# Patient Record
(demographics unavailable — no encounter records)

---

## 2024-10-28 NOTE — ASSESSMENT
[FreeTextEntry1] : Ms. 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  10/07/2024 BW: A1c 8.9% Na+/Cl- 148/110, MCV//34.9, Bun 35, Cr 2.02, eGFR 25, ALT 6, . WDL- H/H, WBC, Cancelled INR. + Heterogenicity of liver reflecting Cirrhosis + US Ab.  > K+ 3.4: On Kcl 10 meq OD advised to take one extra dose prior to the upcoming appt with her Neph Dr. Valiente. > Na+ 146 on Sodium bicarbonate 325 mg TID, advised to stop for now and reach out to Neph MD for further Mx. + LVEF reduced at 30-35% showed on TTE, c/t Jan 2023 her LVEF was 55%. C/t F/u with Cardiology.  + Elevated Na+Cl- Normalized + Elevated ALP - Normalized in the reviewed labs, Advised to C/w Ursodiol. > PH/o isolated ALP elevation 146 <173 (05/05/2021) on Ursodiol at weight based 500 mg BID. Started on MARIANNE for Elevated Tbil and other WDL liver enzymes was reviewed.   # Cirrhosis + on imaging reviewed. discordant with Fibroscan F0-1, will continue with Cirrhosis surveillance Q6 Months. c/t Liver biopsy done April 28, 2011 with an isolated non-necrotizing granuloma without significant fibrosis and no portal inflammation. > HCC screening: No focal hepatic lesions are identified in US ab with WDL-AFP  + PHTN: On carvedilol 3.125 mg BID.  > No EV on the in-Hosp EGD 07/2023. > Thrombocytopenia: mildly low levels, reviewed BW, most probably R/T cirrhosis.  > Ascites: none seen in the imaging. Off Furosemide 20 mg Mx by her NEPH MD. + HE: Advised to stop Lactulose with c/o Diarrhea and still takes it as her  is adherent to the med list.  > Advised to c/w Xifaxan 550 mg BD. + PVT - Reviewed the finding of PVT. I would not want to start on anticoagulation due to her multiple medical problems. She also has a spontaneous splenorenal shunt which may help decompress her portal system.  # RHM: Followed by multiple specialties like Cardiology, Rheumatology, Hematology and Nephrology due to complex health issues. + Immune to HBV, COVID+spike > 250, not to HAV (12/30/2022) s/p HAV INJ on 07/21/2023, will recheck for the Antibodies with next labs. + CT Colonography on 04/20/2022: No obvious colonic polyp or mass. Noted: Atherosclerotic calcifications. Mild diverticulosis. GI Dr. Jayce Recinos + CKD - Reviewed the labs with low eGFR with Elevated Cr, followed by the nephrology Dr. Vlaiente + Anemia - closely F/u with her hematology with labs.  + Elevated uric Acid - Takes allopurinol for Gout maintenance, and F/U with Endocrinology.  F/U in 04/25/2024 with fasting labs/US Ab prior to RPA. Plan of care explained and written down for Spouse Ole. Encouraged to call back in the interim with any issues or concerns so that we can address and assist as required.

## 2024-10-28 NOTE — PHYSICAL EXAM
[Non-Tender] : non-tender [Smooth] : smooth [General Appearance - Alert] : alert [General Appearance - In No Acute Distress] : in no acute distress [Sclera] : the sclera and conjunctiva were normal [PERRL With Normal Accommodation] : pupils were equal in size, round, and reactive to light [Extraocular Movements] : extraocular movements were intact [Neck Appearance] : the appearance of the neck was normal [Neck Cervical Mass (___cm)] : no neck mass was observed [Jugular Venous Distention Increased] : there was no jugular-venous distention [Thyroid Diffuse Enlargement] : the thyroid was not enlarged [Thyroid Nodule] : there were no palpable thyroid nodules [Auscultation Breath Sounds / Voice Sounds] : lungs were clear to auscultation bilaterally [Heart Rate And Rhythm] : heart rate was normal and rhythm regular [Heart Sounds] : normal S1 and S2 [Heart Sounds Gallop] : no gallops [Murmurs] : no murmurs [Heart Sounds Pericardial Friction Rub] : no pericardial rub [Nonpitting Edema] : nonpitting edema present [___ +] : bilateral [unfilled]+ pretibial pitting edema [Bowel Sounds] : normal bowel sounds [Abdomen Soft] : soft [Abdomen Tenderness] : non-tender [Abdomen Mass (___ Cm)] : no abdominal mass palpated [Cervical Lymph Nodes Enlarged Posterior Bilaterally] : posterior cervical [Supraclavicular Lymph Nodes Enlarged Bilaterally] : supraclavicular [Abnormal Walk] : normal gait [Nail Clubbing] : no clubbing  or cyanosis of the fingernails [Musculoskeletal - Swelling] : no joint swelling seen [Motor Tone] : muscle strength and tone were normal [Skin Color & Pigmentation] : normal skin color and pigmentation [Skin Turgor] : normal skin turgor [] : no rash [Deep Tendon Reflexes (DTR)] : deep tendon reflexes were 2+ and symmetric [Sensation] : the sensory exam was normal to light touch and pinprick [No Focal Deficits] : no focal deficits [Oriented To Time, Place, And Person] : oriented to person, place, and time [Impaired Insight] : insight and judgment were intact [Affect] : the affect was normal [Scleral Icterus] : No Scleral Icterus [Hepatojugular Reflux] : patient did not have a sustained hepatojugular reflux [Spider Angioma] : No spider angioma(s) were observed [Abdominal Bruit] : no abdominal bruit [Abdominal  Ascites] : no ascites [Asterixis] : no asterixis observed [Cognitive Mini-Mental Status Normal?] : Cognitive Mini Mental Status Exam is abnormal [Jaundice] : No jaundice [Palmar Erythema] : no Palmar Erythema [Depression] : no depression [FreeTextEntry1] : Wears belt to support back for pain.

## 2024-10-28 NOTE — REVIEW OF SYSTEMS
[Arthralgias] : arthralgias [Negative] : Heme/Lymph [Joint Swelling] : no joint swelling [Joint Stiffness] : no joint stiffness [Limb Pain] : no limb pain [Limb Swelling] : no limb swelling [FreeTextEntry6] : +lung nodule. [FreeTextEntry7] : recently placed on lactulose for high Ammonia in 06/2022 ED Visit.

## 2024-10-28 NOTE — ASSESSMENT
[FreeTextEntry1] : Ms. 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  10/07/2024 BW: A1c 8.9% Na+/Cl- 148/110, MCV//34.9, Bun 35, Cr 2.02, eGFR 25, ALT 6, . WDL- H/H, WBC, Cancelled INR. + Heterogenicity of liver reflecting Cirrhosis + US Ab.  > K+ 3.4: On Kcl 10 meq OD advised to take one extra dose prior to the upcoming appt with her Neph Dr. Valiente. > Na+ 146 on Sodium bicarbonate 325 mg TID, advised to stop for now and reach out to Neph MD for further Mx. + LVEF reduced at 30-35% showed on TTE, c/t Jan 2023 her LVEF was 55%. C/t F/u with Cardiology.  + Elevated Na+Cl- Normalized + Elevated ALP - Normalized in the reviewed labs, Advised to C/w Ursodiol. > PH/o isolated ALP elevation 146 <173 (05/05/2021) on Ursodiol at weight based 500 mg BID. Started on MARIANNE for Elevated Tbil and other WDL liver enzymes was reviewed.   # Cirrhosis + on imaging reviewed. discordant with Fibroscan F0-1, will continue with Cirrhosis surveillance Q6 Months. c/t Liver biopsy done April 28, 2011 with an isolated non-necrotizing granuloma without significant fibrosis and no portal inflammation. > HCC screening: No focal hepatic lesions are identified in US ab with WDL-AFP  + PHTN: On carvedilol 3.125 mg BID.  > No EV on the in-Hosp EGD 07/2023. > Thrombocytopenia: mildly low levels, reviewed BW, most probably R/T cirrhosis.  > Ascites: none seen in the imaging. Off Furosemide 20 mg Mx by her NEPH MD. + HE: Advised to stop Lactulose with c/o Diarrhea and still takes it as her  is adherent to the med list.  > Advised to c/w Xifaxan 550 mg BD. + PVT - Reviewed the finding of PVT. I would not want to start on anticoagulation due to her multiple medical problems. She also has a spontaneous splenorenal shunt which may help decompress her portal system.  # RHM: Followed by multiple specialties like Cardiology, Rheumatology, Hematology and Nephrology due to complex health issues. + Immune to HBV, COVID+spike > 250, not to HAV (12/30/2022) s/p HAV INJ on 07/21/2023, will recheck for the Antibodies with next labs. + CT Colonography on 04/20/2022: No obvious colonic polyp or mass. Noted: Atherosclerotic calcifications. Mild diverticulosis. GI Dr. Jayce Recinos + CKD - Reviewed the labs with low eGFR with Elevated Cr, followed by the nephrology Dr. Valiente + Anemia - closely F/u with her hematology with labs.  + Elevated uric Acid - Takes allopurinol for Gout maintenance, and F/U with Endocrinology.  F/U in 04/25/2024 with fasting labs/US Ab prior to RPA. Plan of care explained and written down for Spouse Ole. Encouraged to call back in the interim with any issues or concerns so that we can address and assist as required.

## 2024-10-28 NOTE — HISTORY OF PRESENT ILLNESS
[FreeTextEntry1] : Ms. 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 Feels well now, Denies any GI complaints at this time.   PH/o of being on MARIANNE for elevated Liver enzymes. Had PVT with no anticoagulation due to her multiple medical problems > Spontaneous splenorenal shunt could help decompress her portal system. 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 Involved multiple specialties in care. 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 No EV on EGD prior to [facility] (07/21/2023) TTE showed LVEF 30-35% < echo in Jan 2023 was 55%. Stage 5-Renal Mx by nephrology Dr. Larsen. + Mild ischemic changes on 04/19/24 ZP: CT head.  Liver biopsy done April 28, 2011, with an isolated non-necrotizing granuloma without significant fibrosis and no portal inflammation. Fibroscan on February 27, 2014, showed F2 disease.  Fibroscan on October 28, 2016, showed F0-1, S0 disease. Fibroscan on 01/11/2021 shows F0-1 (6.3 kPa) and S0 (100 dB/m).  Fibroscan on 07/21/2023 shows F0 (E4.4 kPa) and S0 ( dB/m)  CT Colonography on 04/20/2022: No obvious colonic polyp or mass. Noted: Atherosclerotic calcifications. Mild diverticulosis. GI Dr. Jayce Recinos  Immune to HBV, COVID+ spike > 250 but not to HAV (12/30/2022) s/p HAV INJ on 07/21/2023.   10/07/2024 BW: A1c 8.9% Na+/Cl- 148/110, MCV//34.9, Bun 35, Cr 2.02, eGFR 25, ALT 6, . WDL- H/H, WBC, Cancelled INR. + Heterogenicity of liver reflecting Cirrhosis. BW on 04/25/2024: S1 N2 F2 scored on JACINTO FibroSure+ Alpha 2 Macroglobulin 333, Apolipoprotein A1: 215, glucose 183. Isolated , AG 19, A1C 7.7% Bun/Cr 27/1.58 eGFR 34, , RBC 3.53, .5, MCH 34.3. [facility]- CTAP on 04/03/2024: Cirrhosis and PHTN, Fat containing umbilical hernia noted.   01/02/2024 BW: Normalized  and , WDL-CBC, AFP. Noted chronic low ALT 8, eGFR 30, High Cr 1.74 and glucose 143, Abnormal RBC; Cirrhotic liver + US Ab @ [facility] (CD dropped off)  * ED visit at [facility] and [facility] with UTI? Multiple ED admissions with no interval hospitalization, GIB/procedures. B+ blood group. * Hospital discharge (07/21/2023) with UA PIV and IDs on her right arm present with discharge papers signed by RN in [facility] (07/11 to 20/2023) was treated with ceftriaxone in ED with concern for sepsis, ID recommended pt. go home on with IV antibiotics, to continue until 7/26/2023. ED visit at [facility] on 06/30/22 with ab pain and CT w/o IVC shows stable imaging without acute diagnosis.  06/23/2023 BW: Isolated elevations of , with Plt 121, Na+/Cl- 152/116, Cr 1.54, eGFR 35, A1C 7.1%; WDL- AFP, INR, Lipids, Abnormal RBC 3.42 noted. US ab + 0.2 cm Gall stone. 12/30/2022: Labs shows elevated Na+/Cl- 148/114 Cr 1.39, with low eGFR 40, . WDL- H/H, INR, AFP, Noted: .6/MCH 34.8, Low RBC 3.68. US ab: Chronic fibro fatty parenchymal disease of the liver with cirrhosis without focal hepatic masses. Labs on 07/01/22 shows Low , WBC 3.68, eGFR 37 with Elevated Cr 1.46, Na+/Cl- 146/113; WDL- AFP, INR, Liver enzymes.  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   Labs on 03/28/2022 shows isolated  elevation with pancytopenia- Low Plt 121, Hb/Hct 10.8/33.3, WBC 3.19, eGFR 40, High Cr 1.38 and NA+/Cl- 146/116 CKD. US ab on 02/10/2022 Cirrhosis. No focal hepatic lesions are identified.  Labs on 10/12/2021 shows WDL-AFP 3.2, INR 1.03, Hb/Hct 12.3/37.8, CMP except  < and elevated Bun/Cr 54/2.47 < with low eGFR 19, Plt 84. US ab on 09/27/2021 shows Cirrhosis. No focal lesion.  US ab 05/07/21 Cirrhotic liver morphology. No suspicious hepatic lesion. Labs on 05/5/21 shows Elevated  with WDL-Tbil 0.7 and AST/ALT 32/27, AFP 2.2 and INR 0.96. Thrombocytopenic 91(lowest since 01/15/2019), Hyperglycemic 206. Chronic elevated BUN/Cr 69/2.40 with low eGFR 22. Labs from 11/28/20 shows WDL- AST/ALT/ALP 47/47/169, Elevated Tbil 0.8 and Uric acid 8.2, BUN/Cr 71.1/1.96 with low GFR 32.2. Labs on 10/16/20 shows Low  and WDL- Hb/Hct 123.4/38.8. Iron Sat% 68%, TIBC 225. US ab done on 07/29/20 shows Cirrhosis with 4mm Gall bladder polyp with No Ascites or Lesion. CT chest on 05/13/20 shows No focal abnormality of upper abdomen. 05/06/20 labs shows ALP of 136  Abdominal sonogram January 15, 2019 revealed a cirrhotic liver with a left splenorenal shunt and no ascites. CT scan of the abdomen August 10, 2018 shows cirrhotic liver with a right portal vein thrombosis and a splenorenal shunt. She has portacaval adenopathy. There is trace ascites.  Abdominal sonogram December 8, 2016 shows normal size liver and spleen. The liver is described as nodular. September 2011, hepatitis A and B. serology were negative and ALT was 73 and alkaline phosphatase was 554. AMA was negative with a weakly positive anti-smooth muscle antibody at 1:40. An ACE level was 171. Serum IgA was elevated at 545.  Additional providers: PULM Dr. Marty Cunningham, Ortho Dr. Jairon Toth, NEPH Dr. Valiente, GI Dr. Sanches, Neuro Dr. ARISTIDES Michael.

## 2024-10-28 NOTE — HISTORY OF PRESENT ILLNESS
[FreeTextEntry1] : Ms. 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 Feels well now, Denies any GI complaints at this time.   PH/o of being on MARIANNE for elevated Liver enzymes. Had PVT with no anticoagulation due to her multiple medical problems > Spontaneous splenorenal shunt could help decompress her portal system. 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 Involved multiple specialties in care. 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 No EV on EGD prior to [facility] (07/21/2023) TTE showed LVEF 30-35% < echo in Jan 2023 was 55%. Stage 5-Renal Mx by nephrology Dr. Larsen. + Mild ischemic changes on 04/19/24 ZP: CT head.  Liver biopsy done April 28, 2011, with an isolated non-necrotizing granuloma without significant fibrosis and no portal inflammation. Fibroscan on February 27, 2014, showed F2 disease.  Fibroscan on October 28, 2016, showed F0-1, S0 disease. Fibroscan on 01/11/2021 shows F0-1 (6.3 kPa) and S0 (100 dB/m).  Fibroscan on 07/21/2023 shows F0 (E4.4 kPa) and S0 ( dB/m)  CT Colonography on 04/20/2022: No obvious colonic polyp or mass. Noted: Atherosclerotic calcifications. Mild diverticulosis. GI Dr. Jayce Recinos  Immune to HBV, COVID+ spike > 250 but not to HAV (12/30/2022) s/p HAV INJ on 07/21/2023.   10/07/2024 BW: A1c 8.9% Na+/Cl- 148/110, MCV//34.9, Bun 35, Cr 2.02, eGFR 25, ALT 6, . WDL- H/H, WBC, Cancelled INR. + Heterogenicity of liver reflecting Cirrhosis. BW on 04/25/2024: S1 N2 F2 scored on JACINTO FibroSure+ Alpha 2 Macroglobulin 333, Apolipoprotein A1: 215, glucose 183. Isolated , AG 19, A1C 7.7% Bun/Cr 27/1.58 eGFR 34, , RBC 3.53, .5, MCH 34.3. [facility]- CTAP on 04/03/2024: Cirrhosis and PHTN, Fat containing umbilical hernia noted.   01/02/2024 BW: Normalized  and , WDL-CBC, AFP. Noted chronic low ALT 8, eGFR 30, High Cr 1.74 and glucose 143, Abnormal RBC; Cirrhotic liver + US Ab @ [facility] (CD dropped off)  * ED visit at [facility] and [facility] with UTI? Multiple ED admissions with no interval hospitalization, GIB/procedures. B+ blood group. * Hospital discharge (07/21/2023) with UA PIV and IDs on her right arm present with discharge papers signed by RN in [facility] (07/11 to 20/2023) was treated with ceftriaxone in ED with concern for sepsis, ID recommended pt. go home on with IV antibiotics, to continue until 7/26/2023. ED visit at [facility] on 06/30/22 with ab pain and CT w/o IVC shows stable imaging without acute diagnosis.  06/23/2023 BW: Isolated elevations of , with Plt 121, Na+/Cl- 152/116, Cr 1.54, eGFR 35, A1C 7.1%; WDL- AFP, INR, Lipids, Abnormal RBC 3.42 noted. US ab + 0.2 cm Gall stone. 12/30/2022: Labs shows elevated Na+/Cl- 148/114 Cr 1.39, with low eGFR 40, . WDL- H/H, INR, AFP, Noted: .6/MCH 34.8, Low RBC 3.68. US ab: Chronic fibro fatty parenchymal disease of the liver with cirrhosis without focal hepatic masses. Labs on 07/01/22 shows Low , WBC 3.68, eGFR 37 with Elevated Cr 1.46, Na+/Cl- 146/113; WDL- AFP, INR, Liver enzymes.  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   Labs on 03/28/2022 shows isolated  elevation with pancytopenia- Low Plt 121, Hb/Hct 10.8/33.3, WBC 3.19, eGFR 40, High Cr 1.38 and NA+/Cl- 146/116 CKD. US ab on 02/10/2022 Cirrhosis. No focal hepatic lesions are identified.  Labs on 10/12/2021 shows WDL-AFP 3.2, INR 1.03, Hb/Hct 12.3/37.8, CMP except  < and elevated Bun/Cr 54/2.47 < with low eGFR 19, Plt 84. US ab on 09/27/2021 shows Cirrhosis. No focal lesion.  US ab 05/07/21 Cirrhotic liver morphology. No suspicious hepatic lesion. Labs on 05/5/21 shows Elevated  with WDL-Tbil 0.7 and AST/ALT 32/27, AFP 2.2 and INR 0.96. Thrombocytopenic 91(lowest since 01/15/2019), Hyperglycemic 206. Chronic elevated BUN/Cr 69/2.40 with low eGFR 22. Labs from 11/28/20 shows WDL- AST/ALT/ALP 47/47/169, Elevated Tbil 0.8 and Uric acid 8.2, BUN/Cr 71.1/1.96 with low GFR 32.2. Labs on 10/16/20 shows Low  and WDL- Hb/Hct 123.4/38.8. Iron Sat% 68%, TIBC 225. US ab done on 07/29/20 shows Cirrhosis with 4mm Gall bladder polyp with No Ascites or Lesion. CT chest on 05/13/20 shows No focal abnormality of upper abdomen. 05/06/20 labs shows ALP of 136  Abdominal sonogram January 15, 2019 revealed a cirrhotic liver with a left splenorenal shunt and no ascites. CT scan of the abdomen August 10, 2018 shows cirrhotic liver with a right portal vein thrombosis and a splenorenal shunt. She has portacaval adenopathy. There is trace ascites.  Abdominal sonogram December 8, 2016 shows normal size liver and spleen. The liver is described as nodular. September 2011, hepatitis A and B. serology were negative and ALT was 73 and alkaline phosphatase was 554. AMA was negative with a weakly positive anti-smooth muscle antibody at 1:40. An ACE level was 171. Serum IgA was elevated at 545.  Additional providers: PULM Dr. Marty Cunningham, Ortho Dr. Jaiorn Toth, NEPH Dr. Valiente, GI Dr. Sanches, Neuro Dr. ARISTIDES Michael.

## 2024-11-19 NOTE — HISTORY OF PRESENT ILLNESS
[FreeTextEntry1] : Stephanie presents to the office today with h/o OAB on Gemtesa 75mg. Pt was started on this at 4/16/24 visit. Pt reports 50-60% symptom improvement with Gemtesa. Pt states still had bothersome frequency and would still leak with urgency. Pt states was seen by Dr. Morgan Melgar and had cysto with 100units botox on 11/14/24 with Dr. Melgar at [facility]. Pt states since procedure has been doing well. Denies any increase in urinary frequency or urgency. Denies fever, chills, dysuria, hematuria or flank pain. Denies sensation of incomplete bladder emptying. PVR normal today.

## 2024-11-19 NOTE — DISCUSSION/SUMMARY
[FreeTextEntry1] : Stephanie is a 77yo F who presents for OAB.  Pt recently had cysto with Botox 100 units at [facility] on 11/14/24. Pt has follow-up with urology scheduled on 11/26/24. Pt states had Botox b/c did not think Gemtesa was helping anymore. Pt advised to d/c Gemtesa and to follow up with our office as needed. Pt to keep scheduled appointment with urology. All questions answered. Instructed to call with any questions or concerns.

## 2024-12-04 NOTE — HISTORY OF PRESENT ILLNESS
[Date: ___] : Date of most recent diagnostic polysomnogram: [unfilled] [AHI: ___ per hour] : Apnea-hypopnea index:  [unfilled] per hour [FreeTextEntry1] : Hx sob, CHF, sarcoid seen on liver bx 2011, CKD, REGINALD.  Denies any sob now. C/O nasal drip, some cough, congestion mainly in throat. Was on trelegy but stopped b/c feeling better. She has a neb but not using it.    Spirometry attempted but she had poor effort/comprehension.   CXR done 11/18/24 shows no acute dz.  Hx vertigo. On meclizine.   Has fatigue.    Hx postnasal drip. Sees ENT.   Nephrologist is Dr Duke.  Hepatology is Dr Ferguson.  Heme is Dr Pace.  Sees for anemia. B12 injection.  She is now c/o nonrestorative sleep. Reports had sleep study more than 10 years ago and it was negative. Sleep study done 1/2023 showed moderate REGINALD with AHI 22/hr. Refusing to treat it.

## 2024-12-04 NOTE — PHYSICAL EXAM
[General Appearance - Well Developed] : well developed [Normal Appearance] : normal appearance [General Appearance - In No Acute Distress] : no acute distress [Normal Conjunctiva] : the conjunctiva exhibited no abnormalities [Nail Clubbing] : no clubbing of the fingernails [Cyanosis, Localized] : no localized cyanosis [2+ Pitting] : 2+  pitting [Oriented To Time, Place, And Person] : oriented to person, place, and time [Low Lying Soft Palate] : low lying soft palate [II] : II [Neck Appearance] : the appearance of the neck was normal [Heart Rate And Rhythm] : heart rate and rhythm were normal [Heart Sounds] : normal S1 and S2 [Murmurs] : no murmurs present [] : no respiratory distress [Respiration, Rhythm And Depth] : normal respiratory rhythm and effort [Auscultation Breath Sounds / Voice Sounds] : lungs were clear to auscultation bilaterally [Abdomen Soft] : soft [Abdomen Tenderness] : non-tender [Elongated Uvula] : no elongated uvula [Enlarged Base of the Tongue] : no enlargement of the base of the tongue [FreeTextEntry1] : in WC

## 2024-12-04 NOTE — REVIEW OF SYSTEMS
[Postnasal Drip] : postnasal drip [Cough] : cough [Hypertension] : ~T hypertension [Edema] : ~T edema was present [Anemia] : anemia [Head Injury] : head injury [Thyroid Problem] : thyroid problem [Snoring] : snoring [Fever] : no fever [Chills] : no chills [Dry Eyes] : no dryness of the eyes [Eye Irritation] : no ~T irritation of the eyes [Nasal Congestion] : no nasal congestion [Epistaxis] : no nosebleeds [Sinus Problems] : no sinus problems [Sputum] : not coughing up ~M sputum [Dyspnea] : no dyspnea [Chest Tightness] : no chest tightness [Pleuritic Pain] : no pleuritic pain [Wheezing] : no wheezing [Chest Discomfort] : no chest discomfort [Dysrhythmia] : no dysrhythmia [Murmurs] : no murmurs were heard [Palpitations] : no palpitations [Hay Fever] : no hay fever [Itchy Eyes] : no itching of ~T the eyes [Reflux] : no reflux [Nausea] : no nausea [Vomiting] : no vomiting [Constipation] : no constipation [Diarrhea] : no diarrhea [Abdominal Pain] : no abdominal pain [Dysuria] : no dysuria [Trauma] : no ~T physical trauma [Fracture] : no fracture [Headache] : no headache [Dizziness] : no dizziness [Syncope] : no fainting [Numbness] : no numbness [Paralysis] : no paralysis was seen [Seizures] : no seizures [Depression] : no depression [Anxiety] : no anxiety [Diabetes] : no diabetes mellitus [Witnessed Apneas] : demonstrated no ~M apnea [Nonrestorative Sleep] : restorative sleep

## 2024-12-19 NOTE — HISTORY OF PRESENT ILLNESS
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 Patient is a poor historian; minimally verbal during today's office visit. History obtained predominately from her spouse. Per spouse, he had self-discontinued patient's diuretics. Of note, he provided a list of patient's medications which showed listed meloxicam. He then stated not all her medications are on the list. Reported that she is also on lactulose. Patient denied SOB. Denied any visits to the [facility] and Emergency Department since last office visit.  ---------------------------------------------------------  1/25/24 Pt presents for posthospitalization [id number] by her . Feels well, no acute complaint. She scheduled to see Dr. Glaser (cardiology) next week-resumed on Lasix as per ,  Patient was admitted to [facility] in December-she had presented with AMS. In the ED, labs showed HEVER and hyponatremia . CTH negative for acute pathology.Infectious workup negative. Patient admitted for encephalopathy secondary to HEVER and Hypernatremia. -she was given IV fluids , Lasix, Entresto and lactulose were held with improvement in mental status as well as labs.  She was followed by nephrology team -Entresto was resumed on discharge Also  seen by Heme-Onc for macrocytic anemia and thrombocytopenia, to continue to follow up outpatient with Dr. Sharma. ---------------------------------------------------------------------  05/22/2024 Pt presents for posthospitalization [id number] by her . Feels well, no acute complaint. Pt seen and examined- scheduled for cataract surgery  Pt reports she was asked to held Bumex after Cardiomems check ------------------------------------------------------------------------------------- 09/05/2024 Pt presents for posthospitalization follow-up has some nasal congestion, cough and dizziness- no fever reported had a few urgent care visits-  has a cardiomems- got read recently by Dr Glaser  ---------------------------------------------------------------------------------------  12/19/2024  Patient reports no health-related adverse event since last clinic visit.  NO ER/Hospitalization/urgent care visit. Denies any cardiac or urinary complaints. Has CardioMEMS, follows cardiology No dysuria, gross hematuria, SOB, LUTS. Reports BP has been well controlled.  Patient had labs done recently at [facility].

## 2024-12-19 NOTE — PHYSICAL EXAM
[General Appearance - Alert] : alert [General Appearance - In No Acute Distress] : in no acute distress [General Appearance - Well Nourished] : well nourished [General Appearance - Well Developed] : well developed [Respiration, Rhythm And Depth] : normal respiratory rhythm and effort [Exaggerated Use Of Accessory Muscles For Inspiration] : no accessory muscle use [Auscultation Breath Sounds / Voice Sounds] : lungs were clear to auscultation bilaterally [Heart Rate And Rhythm] : heart rate was normal and rhythm regular [Heart Sounds] : normal S1 and S2 [Bowel Sounds] : normal bowel sounds [] : no rash [No Focal Deficits] : no focal deficits [FreeTextEntry1] : Awake, alert

## 2024-12-19 NOTE — ASSESSMENT
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 Patient is a poor historian; minimally verbal during today's office visit. History obtained predominately from her spouse. Per spouse, he had self-discontinued patient's diuretics. Of note, he provided a list of patient's medications which showed listed meloxicam. He then stated not all her medications are on the list. Reported that she is also on lactulose. Patient denied SOB. Denied any visits to the [facility] and Emergency Department since last office visit.   CKD 3b -Baseline creatinine is ~1.6mg/dL  08/2023 - Cr 1.50mg/dL; UA negative protein, negative blood, 0 RBC, > 1000 glucose   10/09/2023 - Cr 1.50mg/dL April 2024- SCr 1.58 at this time July 2024, serum creatinine 2.02 for a GFR of 25 November 2024, labs done by [facility] serum creatinine 1.6 GFR 34 -Abdominal ultrasound (12/2022) - right kidney 8.1 cm + left kidney 8.8 cm most recent labs with SCr ~ 1.6 (stable)  CHF sp cardiomems continue bumex   hypokalemia in setting of diuresis K+ supplementation  HTN -BP acceptable  Anemia Follows with heme Hb at goal  Mineral Bone Disease in setting of CKD -Parameters are acceptable   Stable CKD 3B Return to clinic in 4 months

## 2024-12-27 NOTE — HISTORY OF PRESENT ILLNESS
[Date: ___] : Date of most recent diagnostic polysomnogram: [unfilled] [AHI: ___ per hour] : Apnea-hypopnea index:  [unfilled] per hour [FreeTextEntry1] : Hx sob, CHF, sarcoid seen on liver bx , CKD, REGINALD.  Denies any sob now. C/O nasal drip, some cough, congestion mainly in throat. Was on trelegy but stopped b/c feeling better. She has duoneb but has not used it in a while; what she has .    Spirometry done today 24 is normal.   CXR done 24 shows no acute dz. Nodules stable on CT for > 2yrs.  Hx vertigo. On meclizine.   Has fatigue.    Hx postnasal drip. Sees ENT.   Nephrologist is Dr Duke.  Hepatology is Dr Ferguson.  Heme is Dr Pace.  Sees for anemia. B12 injection.  She is now c/o nonrestorative sleep. Reports had sleep study more than 10 years ago and it was negative. Sleep study done 2023 showed moderate REGINALD with AHI 22/hr. Refusing to treat it.

## 2024-12-27 NOTE — PLAN
[TextEntry] :  Duoneb prn for now. No trelegy. Recc with patient we treat REGINALD and she does not want to. Annual flu vaccine.

## 2024-12-27 NOTE — PROCEDURE
[FreeTextEntry1] : cr done 12/4/24: no acute dz  review of sleep study  yariel done 12/27/24: no obstruction. no restriction.

## 2025-03-04 NOTE — PHYSICAL EXAM
[General Appearance - Alert] : alert [General Appearance - In No Acute Distress] : in no acute distress [General Appearance - Well-Appearing] : healthy appearing [Oriented To Time, Place, And Person] : oriented to person, place, and time [Impaired Insight] : insight and judgment were intact [Affect] : the affect was normal [Memory Recent] : recent memory was not impaired [Person] : oriented to person [Place] : oriented to place [Time] : oriented to time [Concentration Intact] : normal concentrating ability [Fluency] : fluency intact [Comprehension] : comprehension intact [Cranial Nerves Optic (II)] : visual acuity intact bilaterally,  visual fields full to confrontation, pupils equal round and reactive to light [Cranial Nerves Oculomotor (III)] : extraocular motion intact [Cranial Nerves Trigeminal (V)] : facial sensation intact symmetrically [Cranial Nerves Facial (VII)] : face symmetrical [Cranial Nerves Vestibulocochlear (VIII)] : hearing was intact bilaterally [Cranial Nerves Glossopharyngeal (IX)] : tongue and palate midline [Cranial Nerves Accessory (XI - Cranial And Spinal)] : head turning and shoulder shrug symmetric [Cranial Nerves Hypoglossal (XII)] : there was no tongue deviation with protrusion [Motor Tone] : muscle tone was normal in all four extremities [Motor Strength] : muscle strength was normal in all four extremities [No Muscle Atrophy] : normal bulk in all four extremities [Paresis Pronator Drift Right-Sided] : no pronator drift on the right [Paresis Pronator Drift Left-Sided] : no pronator drift on the left [Sensation Tactile Decrease] : light touch was intact [Proprioception] : proprioception was intact [Romberg's Sign] : Romberg's sign was negtive [Past-pointing] : there was no past-pointing [Tremor] : no tremor present [Coordination - Dysmetria Impaired Finger-to-Nose Bilateral] : not present [Coordination - Dysmetria Impaired Heel-to-Shin Bilateral] : not present [1+] : Brachioradialis left 1+ [0] : Ankle jerk left 0 [Plantar Reflex Right Only] : normal on the right [Plantar Reflex Left Only] : normal on the left [FreeTextEntry7] : Decreased pin sensation distal legs bilaterally [FreeTextEntry8] : Gait mildly slowed, and mildly unsteady typically uses a cane but able to ambulate independently. [PERRL With Normal Accommodation] : pupils were equal in size, round, reactive to light, with normal accommodation [Extraocular Movements] : extraocular movements were intact [Full Visual Field] : full visual field

## 2025-03-04 NOTE — CONSULT LETTER
[Dear  ___] : Dear  [unfilled], [Consult Letter:] : I had the pleasure of evaluating your patient, [unfilled]. [Please see my note below.] : Please see my note below. [Consult Closing:] : Thank you very much for allowing me to participate in the care of this patient.  If you have any questions, please do not hesitate to contact me. [Sincerely,] : Sincerely, [FreeTextEntry3] : Jj Michael MD, PhD, DPN-N\par  [facility]\par  Neurology at [facility]\par  Chief, Division of Neurology\par  [facility]\par

## 2025-03-04 NOTE — ASSESSMENT
[FreeTextEntry1] : Status post right thalamic hemorrhage following a fall as discussed above, now resolved Diabetic neuropathy in addition to multiple other medical problems as delineated above Gait  unsteady without any focal deficit on examination Completed physical therapy  Mild left carpal tunnel syndrome She will continue to use the wrist brace on the left wrist at night when she sleeps.  Routine follow-up planned in 1 year, sooner should the need arise.

## 2025-03-04 NOTE — HISTORY OF PRESENT ILLNESS
[FreeTextEntry1] : I saw her initially 2/16/2022 with the following history.  This 74-year-old woman was seen in neurological consultation today. She is here for a second opinion. She follows with a neurologist in [place]. On 11/23/21 she fell. She fractured her left wrist and had a small right thalamic hemorrhage. She was admitted to [facility] for 4 days. Imaging studies summarized below. She went to [facility] on 12/2/21. She actually went because of left wrist pain. CT scan of the head done at that time was stable.  She again went to the emergency room on 1/26/22 for weakness. She was found to have hypokalemia and pneumonia. Those problems have been corrected.  Her main complaint now is that she remains somewhat off balance. She does have known diabetic neuropathy as well as chronic back pain. physical therapy has been recommended  CT scan of the head done 2/10/22 showed resolution of the hemorrhage.  Her medical history is significant for diabetes, sarcoid, chronic renal failure, congestive heart failure, cirrhosis, chronic back pain cardiomyopathy, defibrillator  Nonsmoker. No history of alcohol use.  I saw her 3/4/2024 with the following history. Her neurologist in [place] retired so she will be following here.  She has completed physical therapy.  She believes it helped her.  She still has wrist and hand pain for which she follows up with a hand specialist. She was complaining of numbness of the left hand.  Will wax and wane.  Worse at night. EMG and nerve conduction studies done 6/29/2023 showed mild left carpal tunnel syndrome I recommend use of a cock up wrist splint which she has been using intermittently and numbness remains mild. She was neurologically stable  She returned 4/9/2024 with a new problem About a month ago she developed what she described as an upper respiratory infection She developed some pressure in her head bifrontally She saw an ENT specialist to rule out a sinus infection and he did rule that out. He advised her to get a CAT scan of the head but did not order it She does have a defibrillator and says she cannot have an MRI Her upper respiratory infection has cleared up. She still has bifrontal pressure.  Will wax and wane No visual disturbance, nausea or vomiting  I referred her for CT scan of the head at that time which showed mild small vessel ischemic changes  She returns today, 3/4/2025 for routine follow-up Headaches have pretty much resolved She offers no new complaints.

## 2025-03-04 NOTE — DATA REVIEWED
[de-identified] : Reviewed:\par  Records from neurologist Dr. Turpin\par  CT scan of the head from 11/23/21 at [facility] showing a small right thalamic hemorrhage\par  CT scan of the head from 12/2/21 from [facility] showing stable hemorrhage\par  CT scan of the head dated 2/10/2 showing resolution of hemorrhage.

## 2025-03-14 NOTE — ASSESSMENT
[FreeTextEntry1] :  CKD 3b -Baseline creatinine is ~1.6mg/dL  08/2023 - Cr 1.50mg/dL; UA negative protein, negative blood, 0 RBC, > 1000 glucose   10/09/2023 - Cr 1.50mg/dL April 2024- SCr 1.58  July 2024, serum creatinine 2.02 for a GFR of 25 November 2024, labs done by [facility] serum creatinine 1.6 GFR 34 -Abdominal ultrasound (12/2022) - right kidney 8.1 cm + left kidney 8.8 cm Patient with HEVER during recent hospitalization-serum creatinine 2.9 on presentation on March 8-improved to 1.1 on discharge-likely dilutional in setting of IV fluids. Patient now with leg edema 2+ Resume Bumex Also okay to resume Farxiga  hypokalemia in setting of diarrhea+ diuretics use Continue KCl  CHF sp cardiomems continue bumex  HTN -BP acceptable  Anemia Follows with heme Hb at goal  Mineral Bone Disease in setting of CKD -Parameters are acceptable   metabolic acidosis resume sodium bicarb 650 mg bid   Return to clinic in 3 months

## 2025-03-14 NOTE — HISTORY OF PRESENT ILLNESS
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 Patient is a poor historian; minimally verbal during today's office visit. History obtained predominately from her spouse. Per spouse, he had self-discontinued patient's diuretics. Of note, he provided a list of patient's medications which showed listed meloxicam. He then stated not all her medications are on the list. Reported that she is also on lactulose. Patient denied SOB. Denied any visits to the [facility] and Emergency Department since last office visit.  ---------------------------------------------------------  1/25/24 Pt presents for posthospitalization [id number] by her . Feels well, no acute complaint. She scheduled to see Dr. Glaser (cardiology) next week-resumed on Lasix as per ,  Patient was admitted to [facility] in December-she had presented with AMS. In the ED, labs showed HEVER and hyponatremia . CTH negative for acute pathology.Infectious workup negative. Patient admitted for encephalopathy secondary to HEVER and Hypernatremia. -she was given IV fluids , Lasix, Entresto and lactulose were held with improvement in mental status as well as labs.  She was followed by nephrology team -Entresto was resumed on discharge Also  seen by Heme-Onc for macrocytic anemia and thrombocytopenia, to continue to follow up outpatient with Dr. Sharma. ---------------------------------------------------------------------  05/22/2024 Pt presents for posthospitalization [id number] by her . Feels well, no acute complaint. Pt seen and examined- scheduled for cataract surgery  Pt reports she was asked to held Bumex after Cardiomems check ------------------------------------------------------------------------------------- 09/05/2024 Pt presents for posthospitalization follow-up has some nasal congestion, cough and dizziness- no fever reported had a few urgent care visits-  has a cardiomems- got read recently by Dr Glaser  ---------------------------------------------------------------------------------------  12/19/2024  Patient reports no health-related adverse event since last clinic visit.  NO ER/Hospitalization/urgent care visit. Denies any cardiac or urinary complaints. Has CardioMEMS, follows cardiology No dysuria, gross hematuria, SOB, LUTS. Reports BP has been well controlled.  Patient had labs done recently at [facility].  ------------------------------------- 03/14/2025  Pt was admitted at [facility] form  03/08-03/12 for dehydration and UTI She presents today for post hospitalization follow up. Accompanied by her , Hospital records including notes, labs discharge summary and discharge medications reviewed in detail. She presented to the hospital with diarrhea, confusion-noted to have hypokalemia and HEVER-status post IV fluids.  Electrolytes were repleted.  Started on antibiotics for presumed UTI and diuretics were stopped.  GI PCR negative, stool cultures negative CT-without evidence of colitis.  Urine culture with no growth and antibiotics were ultimately stopped. Bumex and farxiga were stopped. c/o leg edema. Denies any further episodes of diarrhea. Patient was seen by cardiologist Dr. Ole Glaser yesterday.

## 2025-03-27 NOTE — PHYSICAL EXAM
[General Appearance - Alert] : alert [Oriented To Time, Place, And Person] : oriented to person, place, and time [Motor Strength] : muscle strength was normal in all four extremities [Involuntary Movements] : no involuntary movements were seen [No Muscle Atrophy] : normal bulk in all four extremities [FreeTextEntry6] : 5/5 UE and LE bilaterally

## 2025-03-27 NOTE — REVIEW OF SYSTEMS
[As Noted in HPI] : as noted in HPI [Difficulty Walking] : difficulty walking [Negative] : Heme/Lymph [Numbness] : no numbness [Tingling] : no tingling

## 2025-03-27 NOTE — REASON FOR VISIT
[Follow-Up: _____] : a [unfilled] follow-up visit [FreeTextEntry1] : MARI BUENROSTRO is a 77 year old female presents for follow up visit/post hospitalization 3/8-3/12/2025  s/p Fall found to have an acute L5 compression fracture.  endplate fracture deformity of L5 without bony retropulsion Today she presents in a wheelchair without the TLSO brace. She states she has been wearing it at home.  Mild thoracic/lumbar pain.  Pain intensity 6/10. Denies any saddle anesthesia, urinary or bowel incontinence. She is a wheelchair at today's visit.

## 2025-03-27 NOTE — ASSESSMENT
[FreeTextEntry1] : MARI BUENROSTRO is a 77 year old female presents for follow up visit/post hospitalization 3/8-3/12/2025  s/p Fall found to have an acute L5 compression fracture.  endplate fracture deformity of L5 without bony retropulsion Today she presents in a wheelchair without the TLSO brace. She states she has been wearing it at home.  Mild thoracic/lumbar pain.  Pain intensity 6/10. Denies any saddle anesthesia, urinary or bowel incontinence. She is a wheelchair at today's visit.   Plan: Xray of thoracolumbar spine with brace standing if possible.  f/u after imaging Patient has been given an opportunity to ask and have their questions answered to their satisfaction. Patient knows to call the office if there are any new or worsening symptoms.    I, Dr. Augustine Tejada, personally performed the evaluation and management (E/M) services for this established patient who presents today. That E/M includes conducting the clinically appropriate interval history &/or exam and establishing a continued plan of care. Today, my TIFFANI, Cherelle Cavazos, was here to observe my evaluation and management service for this patient and follow the plan of care established by me going forward.

## 2025-04-14 NOTE — ASSESSMENT
[FreeTextEntry1] : MARI BUENROSTRO is a 77 year old female presents for follow up visit/post hospitalization 3/8-3/12/2025 s/p Fall found to have an acute L5 compression fracture. endplate fracture deformity of L5 without bony retropulsion In review of Xray of thoracolumbar spine no acute findings. Patient states she is feeling better and ambulating with a cane.   No new falls.  Plan: Physical therapy for lower back and LE strengthening.  f/u as needed.  Patient has been given an opportunity to ask and have their questions answered to their satisfaction. Patient knows to call the office if there are any new or worsening symptoms.

## 2025-04-14 NOTE — REASON FOR VISIT
[Follow-Up: _____] : a [unfilled] follow-up visit [FreeTextEntry1] : MARI BUENROSTRO is a 77 year old female presents for follow up visit/post hospitalization 3/8-3/12/2025  She is here for review of xray of thoracolumbar spine for surveillance of L5 fracture from  4/2025 s/p Fall found to have an acute L5 compression fracture.  endplate fracture deformity of L5 without bony retropulsion Today she presents in a wheelchair without the TLSO brace. She states she has been wearing it at home.  Mild thoracic/lumbar pain.  Pain intensity 6/10. Denies any saddle anesthesia, urinary or bowel incontinence. She is a wheelchair at today's visit.

## 2025-05-06 NOTE — DISCUSSION/SUMMARY
[FreeTextEntry1] : Discussed cloudy urine seen in office and UA dipstick read small leukocytes which can indicate a UTI with her symptoms. Will be sending urine for cath UA and urine culture for further evaluation; will follow up with results and treat as needed. Pt verbalized understanding. Affirm sent to r/o vaginitis. Will treat based on results. Pt verbalized understanding.  We reviewed Botox therapy in detail, including the risks of the procedure such as UTI, recurrent UTI, incomplete emptying requiring catheterization, failure of procedure, dysuria, hematuria, bladder pain, painful urination, injury to the bladder, systemic effects like muscle weakness. We discussed the transient effects of Botox and need for future injections once effects wear off, if successful and desired by patient.  We discussed pre-procedure instructions including antibiotics which start 3 days prior to procedure and continue post procedure. Pt states she is not sure if she will continue with Botox and would like to discuss further with her . Pt aware to send our office her medical records from Urologist office to see if UDS will need to be done, in the event she decides she would like to proceed with Botox with our office. At this time pt will c/w Bernie and will call our office on how she would like to proceed with her OAB management. All questions answered. Instructed to call with any questions or concerns.

## 2025-05-06 NOTE — HISTORY OF PRESENT ILLNESS
[FreeTextEntry1] : Stephanie presents to the office today with h/o OAB s/p botox 100units at [facility] with urologist Dr. Melgar on 11/14/24. Pt was started on Gemtesa 75mg at 4/16/24 visit with MD Jones and was going to d/c Gemtesa after receiving Botox therapy. Pt states she stopped Gemtesa "for a little while" after getting Botox but then "went back on it". Pt states OAB symptoms were well controlled up until about 1-2 weeks ago when pt c/o increased urinary frequency, urgency and painful urination. Pt states had urine testing with urology and was noted to have a UTI. Pt was started on Levaquin and states last dose was Wednesday 4/30/25. Today, pt reports painful urination and vaginal itching. Denies fever, chills, hematuria or flank pain. Denies sensation of incomplete bladder emptying. Pt states she does not want to go back to the hospital for Botox and is here today for a second opinion about OAB management options.